# Patient Record
Sex: FEMALE | Race: OTHER | HISPANIC OR LATINO
[De-identification: names, ages, dates, MRNs, and addresses within clinical notes are randomized per-mention and may not be internally consistent; named-entity substitution may affect disease eponyms.]

---

## 2022-06-23 ENCOUNTER — APPOINTMENT (OUTPATIENT)
Dept: DERMATOLOGY | Facility: CLINIC | Age: 52
End: 2022-06-23
Payer: MEDICAID

## 2022-06-23 VITALS — WEIGHT: 150 LBS | BODY MASS INDEX: 28.32 KG/M2 | HEIGHT: 61 IN

## 2022-06-23 DIAGNOSIS — L30.9 DERMATITIS, UNSPECIFIED: ICD-10-CM

## 2022-06-23 PROBLEM — Z00.00 ENCOUNTER FOR PREVENTIVE HEALTH EXAMINATION: Status: ACTIVE | Noted: 2022-06-23

## 2022-06-23 PROCEDURE — 99204 OFFICE O/P NEW MOD 45 MIN: CPT

## 2022-06-23 RX ORDER — HYDROCORTISONE 25 MG/G
2.5 CREAM TOPICAL
Qty: 1 | Refills: 1 | Status: ACTIVE | COMMUNITY
Start: 2022-06-23 | End: 1900-01-01

## 2022-06-23 NOTE — HISTORY OF PRESENT ILLNESS
[FreeTextEntry1] : rash on the face [de-identified] : 51F here for rash around the mouth and NLF x months. Itchy. Using vaseline with some improvement.

## 2022-06-23 NOTE — PHYSICAL EXAM
[Alert] : alert [Oriented x 3] : ~L oriented x 3 [Well Nourished] : well nourished [Conjunctiva Non-injected] : conjunctiva non-injected [No Visual Lymphadenopathy] : no visual  lymphadenopathy [No Clubbing] : no clubbing [No Edema] : no edema [No Bromhidrosis] : no bromhidrosis [No Chromhidrosis] : no chromhidrosis [FreeTextEntry3] : Mild erythema lovely-orally today

## 2022-09-08 ENCOUNTER — APPOINTMENT (OUTPATIENT)
Dept: DERMATOLOGY | Facility: CLINIC | Age: 52
End: 2022-09-08

## 2022-11-02 ENCOUNTER — OFFICE (OUTPATIENT)
Dept: URBAN - METROPOLITAN AREA CLINIC 94 | Facility: CLINIC | Age: 52
Setting detail: OPHTHALMOLOGY
End: 2022-11-02
Payer: COMMERCIAL

## 2022-11-02 DIAGNOSIS — H40.013: ICD-10-CM

## 2022-11-02 PROCEDURE — 92083 EXTENDED VISUAL FIELD XM: CPT | Performed by: OPHTHALMOLOGY

## 2022-11-02 PROCEDURE — 92250 FUNDUS PHOTOGRAPHY W/I&R: CPT | Performed by: OPHTHALMOLOGY

## 2022-11-02 PROCEDURE — 92012 INTRM OPH EXAM EST PATIENT: CPT | Performed by: OPHTHALMOLOGY

## 2022-11-02 ASSESSMENT — REFRACTION_AUTOREFRACTION
OS_CYLINDER: -0.75
OD_AXIS: 083
OD_CYLINDER: -0.50
OD_SPHERE: +2.25
OS_SPHERE: +2.50
OS_AXIS: 112

## 2022-11-02 ASSESSMENT — REFRACTION_MANIFEST
OD_VA1: 20/20
OS_CYLINDER: SPH
OS_VA1: 20/20
OS_SPHERE: +1.00
OS_SPHERE: +1.50
OD_ADD: +2.00
OS_AXIS: 112
OS_ADD: +2.00
OS_CYLINDER: -0.25
OD_SPHERE: +1.00
OS_ADD: +1.75
OD_CYLINDER: SPH
OD_VA1: 20/20
OD_VA2: 20/20
OD_AXIS: 066
OD_CYLINDER: -0.25
OS_VA1: 20/20
OU_VA: 20/20
OD_ADD: +1.75
OS_VA2: 20/20
OU_VA: 20/20
OD_SPHERE: +1.50

## 2022-11-02 ASSESSMENT — SUPERFICIAL PUNCTATE KERATITIS (SPK)
OS_SPK: T
OD_SPK: 1+

## 2022-11-02 ASSESSMENT — CONFRONTATIONAL VISUAL FIELD TEST (CVF)
OD_FINDINGS: FULL
OS_FINDINGS: FULL

## 2022-11-02 ASSESSMENT — TONOMETRY
OD_IOP_MMHG: 15
OS_IOP_MMHG: 15

## 2022-11-02 ASSESSMENT — SPHEQUIV_DERIVED
OS_SPHEQUIV: 0.875
OD_SPHEQUIV: 2
OD_SPHEQUIV: 0.875
OS_SPHEQUIV: 2.125

## 2022-11-02 ASSESSMENT — KERATOMETRY
OS_K2POWER_DIOPTERS: 43.50
OS_K1POWER_DIOPTERS: 43.00
OD_K2POWER_DIOPTERS: 43.00
OD_K1POWER_DIOPTERS: 42.50
OD_AXISANGLE_DEGREES: 113
OS_AXISANGLE_DEGREES: 064

## 2022-11-02 ASSESSMENT — REFRACTION_CURRENTRX
OD_ADD: +2.00
OD_VPRISM_DIRECTION: PROGS
OS_AXIS: 000
OS_VPRISM_DIRECTION: PROGS
OS_SPHERE: +1.50
OD_OVR_VA: 20/
OD_AXIS: 000
OD_SPHERE: +1.50
OS_OVR_VA: 20/
OS_CYLINDER: 0.00
OD_CYLINDER: 0.00
OS_ADD: +2.00

## 2022-11-02 ASSESSMENT — AXIALLENGTH_DERIVED
OS_AL: 23.3454
OS_AL: 22.8781
OD_AL: 23.5267
OD_AL: 23.0987

## 2022-11-02 ASSESSMENT — VISUAL ACUITY
OS_BCVA: 20/30-1
OD_BCVA: 20/25

## 2022-11-02 ASSESSMENT — TEAR BREAK UP TIME (TBUT)
OS_TBUT: 1+
OD_TBUT: 1+

## 2023-05-03 ENCOUNTER — OFFICE (OUTPATIENT)
Dept: URBAN - METROPOLITAN AREA CLINIC 94 | Facility: CLINIC | Age: 53
Setting detail: OPHTHALMOLOGY
End: 2023-05-03
Payer: COMMERCIAL

## 2023-05-03 DIAGNOSIS — H40.013: ICD-10-CM

## 2023-05-03 DIAGNOSIS — H25.13: ICD-10-CM

## 2023-05-03 DIAGNOSIS — H04.123: ICD-10-CM

## 2023-05-03 PROCEDURE — 92133 CPTRZD OPH DX IMG PST SGM ON: CPT | Performed by: OPHTHALMOLOGY

## 2023-05-03 PROCEDURE — 92020 GONIOSCOPY: CPT | Performed by: OPHTHALMOLOGY

## 2023-05-03 PROCEDURE — 92014 COMPRE OPH EXAM EST PT 1/>: CPT | Performed by: OPHTHALMOLOGY

## 2023-05-03 ASSESSMENT — TEAR BREAK UP TIME (TBUT)
OD_TBUT: 1+
OS_TBUT: 1+

## 2023-05-03 ASSESSMENT — SPHEQUIV_DERIVED
OD_SPHEQUIV: 2.125
OS_SPHEQUIV: 0.875
OS_SPHEQUIV: 2.125
OD_SPHEQUIV: 0.875

## 2023-05-03 ASSESSMENT — REFRACTION_AUTOREFRACTION
OS_CYLINDER: -0.75
OD_CYLINDER: -0.75
OD_AXIS: 082
OS_AXIS: 105
OD_SPHERE: +2.50
OS_SPHERE: +2.50

## 2023-05-03 ASSESSMENT — REFRACTION_MANIFEST
OS_SPHERE: +1.50
OS_SPHERE: +1.00
OD_SPHERE: +1.00
OS_CYLINDER: SPH
OD_CYLINDER: -0.25
OU_VA: 20/20
OD_VA2: 20/20
OU_VA: 20/20
OS_ADD: +1.75
OS_CYLINDER: -0.25
OD_ADD: +2.00
OD_VA1: 20/20
OS_VA1: 20/20
OS_VA2: 20/20
OD_AXIS: 066
OS_AXIS: 112
OS_VA1: 20/20
OD_ADD: +1.75
OD_VA1: 20/20
OS_ADD: +2.00
OD_CYLINDER: SPH
OD_SPHERE: +1.50

## 2023-05-03 ASSESSMENT — AXIALLENGTH_DERIVED
OD_AL: 23.4357
OD_AL: 22.9648
OS_AL: 23.3905
OS_AL: 22.9214

## 2023-05-03 ASSESSMENT — CONFRONTATIONAL VISUAL FIELD TEST (CVF)
OD_FINDINGS: FULL
OS_FINDINGS: FULL

## 2023-05-03 ASSESSMENT — REFRACTION_CURRENTRX
OD_OVR_VA: 20/
OS_OVR_VA: 20/
OS_CYLINDER: 0.00
OD_AXIS: 000
OS_ADD: +2.00
OD_ADD: +2.00
OD_SPHERE: +1.50
OS_AXIS: 000
OS_VPRISM_DIRECTION: PROGS
OD_CYLINDER: 0.00
OS_SPHERE: +1.50
OD_VPRISM_DIRECTION: PROGS

## 2023-05-03 ASSESSMENT — TONOMETRY
OS_IOP_MMHG: 16
OD_IOP_MMHG: 16

## 2023-05-03 ASSESSMENT — KERATOMETRY
OD_K1POWER_DIOPTERS: 43.00
OD_K2POWER_DIOPTERS: 43.00
OS_K1POWER_DIOPTERS: 43.00
OD_AXISANGLE_DEGREES: 090
OS_AXISANGLE_DEGREES: 064
OS_K2POWER_DIOPTERS: 43.25

## 2023-05-03 ASSESSMENT — VISUAL ACUITY
OD_BCVA: 20/25-1
OS_BCVA: 20/40

## 2023-05-03 ASSESSMENT — SUPERFICIAL PUNCTATE KERATITIS (SPK)
OD_SPK: 1+
OS_SPK: T

## 2023-05-17 ENCOUNTER — OFFICE (OUTPATIENT)
Dept: URBAN - METROPOLITAN AREA CLINIC 116 | Facility: CLINIC | Age: 53
Setting detail: OPHTHALMOLOGY
End: 2023-05-17
Payer: COMMERCIAL

## 2023-05-17 DIAGNOSIS — H10.45: ICD-10-CM

## 2023-05-17 PROCEDURE — 92014 COMPRE OPH EXAM EST PT 1/>: CPT | Performed by: OPTOMETRIST

## 2023-05-17 ASSESSMENT — REFRACTION_CURRENTRX
OS_SPHERE: +1.50
OD_OVR_VA: 20/
OS_AXIS: 000
OD_SPHERE: +1.75
OS_OVR_VA: 20/
OD_AXIS: 180
OD_OVR_VA: 20/
OD_ADD: +2.00
OD_AXIS: 000
OD_VPRISM_DIRECTION: PROGS
OS_VPRISM_DIRECTION: PROGS
OD_CYLINDER: -0.25
OS_CYLINDER: SPH
OS_ADD: +1.75
OD_CYLINDER: 0.00
OS_OVR_VA: 20/
OS_SPHERE: +1.50
OS_VPRISM_DIRECTION: PROGS
OD_SPHERE: +1.50
OD_ADD: +1.75
OS_CYLINDER: 0.00
OS_ADD: +2.00
OD_VPRISM_DIRECTION: PROGS

## 2023-05-17 ASSESSMENT — REFRACTION_MANIFEST
OS_VA1: 20/20
OD_ADD: +2.00
OD_SPHERE: +1.50
OS_VA2: 20/20
OD_CYLINDER: SPH
OS_ADD: +2.00
OU_VA: 20/20
OD_VA1: 20/20
OD_ADD: +2.00
OD_VA1: 20/20
OS_CYLINDER: SPH
OD_ADD: +1.75
OS_SPHERE: +1.50
OD_AXIS: 066
OS_CYLINDER: -0.25
OS_ADD: +1.75
OS_VA1: 20/20
OD_SPHERE: +1.00
OS_VA1: 20/20
OS_CYLINDER: SPH
OS_SPHERE: +1.50
OS_AXIS: 112
OS_ADD: +2.00
OD_SPHERE: +1.50
OD_CYLINDER: -0.25
OS_SPHERE: +1.00
OD_VA1: 20/20
OU_VA: 20/20
OD_VA2: 20/20
OD_CYLINDER: SPH

## 2023-05-17 ASSESSMENT — AXIALLENGTH_DERIVED
OS_AL: 23.0601
OD_AL: 23.1041
OD_AL: 23.4357
OS_AL: 23.3905

## 2023-05-17 ASSESSMENT — CONFRONTATIONAL VISUAL FIELD TEST (CVF)
OS_FINDINGS: FULL
OD_FINDINGS: FULL

## 2023-05-17 ASSESSMENT — REFRACTION_AUTOREFRACTION
OS_AXIS: 115
OS_CYLINDER: -0.50
OD_CYLINDER: -0.50
OD_AXIS: 080
OS_SPHERE: +2.00
OD_SPHERE: +2.00

## 2023-05-17 ASSESSMENT — SUPERFICIAL PUNCTATE KERATITIS (SPK)
OD_SPK: 1+
OS_SPK: T

## 2023-05-17 ASSESSMENT — SPHEQUIV_DERIVED
OD_SPHEQUIV: 0.875
OD_SPHEQUIV: 1.75
OS_SPHEQUIV: 1.75
OS_SPHEQUIV: 0.875

## 2023-05-17 ASSESSMENT — KERATOMETRY
OS_K1POWER_DIOPTERS: 43.00
OD_AXISANGLE_DEGREES: 090
OS_K2POWER_DIOPTERS: 43.25
OD_K1POWER_DIOPTERS: 43.00
OS_AXISANGLE_DEGREES: 105
OD_K2POWER_DIOPTERS: 43.00

## 2023-05-17 ASSESSMENT — TEAR BREAK UP TIME (TBUT)
OS_TBUT: 1+
OD_TBUT: 1+

## 2023-05-17 ASSESSMENT — VISUAL ACUITY
OS_BCVA: 20/40
OD_BCVA: 20/25-1

## 2023-05-17 ASSESSMENT — TONOMETRY
OS_IOP_MMHG: 19
OD_IOP_MMHG: 18

## 2023-11-01 ENCOUNTER — OFFICE (OUTPATIENT)
Dept: URBAN - METROPOLITAN AREA CLINIC 94 | Facility: CLINIC | Age: 53
Setting detail: OPHTHALMOLOGY
End: 2023-11-01
Payer: COMMERCIAL

## 2023-11-01 DIAGNOSIS — H25.13: ICD-10-CM

## 2023-11-01 DIAGNOSIS — H40.013: ICD-10-CM

## 2023-11-01 DIAGNOSIS — H52.4: ICD-10-CM

## 2023-11-01 PROCEDURE — 92012 INTRM OPH EXAM EST PATIENT: CPT | Performed by: OPHTHALMOLOGY

## 2023-11-01 PROCEDURE — 92083 EXTENDED VISUAL FIELD XM: CPT | Performed by: OPHTHALMOLOGY

## 2023-11-01 PROCEDURE — 92015 DETERMINE REFRACTIVE STATE: CPT | Performed by: OPHTHALMOLOGY

## 2023-11-01 PROCEDURE — 92250 FUNDUS PHOTOGRAPHY W/I&R: CPT | Performed by: OPHTHALMOLOGY

## 2023-11-01 ASSESSMENT — REFRACTION_CURRENTRX
OD_ADD: +2.00
OD_AXIS: 180
OD_VPRISM_DIRECTION: PROGS
OD_SPHERE: +1.75
OD_VPRISM_DIRECTION: PROGS
OS_ADD: +1.75
OS_OVR_VA: 20/
OD_ADD: +1.75
OS_SPHERE: +1.50
OS_OVR_VA: 20/
OS_AXIS: 000
OS_SPHERE: +1.50
OD_SPHERE: +1.50
OD_OVR_VA: 20/
OS_VPRISM_DIRECTION: PROGS
OS_ADD: +2.00
OD_AXIS: 000
OS_CYLINDER: SPH
OD_OVR_VA: 20/
OS_VPRISM_DIRECTION: PROGS
OS_CYLINDER: 0.00
OD_CYLINDER: -0.25
OD_CYLINDER: 0.00

## 2023-11-01 ASSESSMENT — REFRACTION_MANIFEST
OD_ADD: +2.00
OU_VA: 20/20
OS_ADD: +2.00
OD_CYLINDER: SPH
OD_VA2: 20/20
OS_VA1: 20/20
OD_ADD: +1.75
OD_SPHERE: +1.00
OD_SPHERE: +1.50
OD_VA1: 20/20
OS_VA2: 20/20
OS_CYLINDER: SPH
OS_AXIS: 112
OS_CYLINDER: SPH
OS_VA1: 20/20
OS_VA1: 20/20
OS_CYLINDER: -0.25
OD_VA1: 20/20
OD_CYLINDER: SPH
OS_ADD: +2.00
OS_SPHERE: +1.50
OU_VA: 20/20
OD_ADD: +2.00
OS_SPHERE: +1.50
OD_VA1: 20/20
OD_CYLINDER: -0.25
OS_ADD: +1.75
OD_SPHERE: +1.50
OS_SPHERE: +1.00
OD_AXIS: 066

## 2023-11-01 ASSESSMENT — CONFRONTATIONAL VISUAL FIELD TEST (CVF)
OS_FINDINGS: FULL
OD_FINDINGS: FULL

## 2023-11-01 ASSESSMENT — SPHEQUIV_DERIVED
OS_SPHEQUIV: 0.875
OS_SPHEQUIV: 1.75
OD_SPHEQUIV: 0.875
OD_SPHEQUIV: 1.875

## 2023-11-01 ASSESSMENT — REFRACTION_AUTOREFRACTION
OD_AXIS: 072
OS_SPHERE: +2.00
OS_AXIS: 100
OD_SPHERE: +2.25
OS_CYLINDER: -0.50
OD_CYLINDER: -0.75

## 2023-11-01 ASSESSMENT — SUPERFICIAL PUNCTATE KERATITIS (SPK)
OS_SPK: T
OD_SPK: 1+

## 2023-11-01 ASSESSMENT — TEAR BREAK UP TIME (TBUT)
OD_TBUT: 1+
OS_TBUT: 1+

## 2024-05-15 ENCOUNTER — OFFICE (OUTPATIENT)
Dept: URBAN - METROPOLITAN AREA CLINIC 94 | Facility: CLINIC | Age: 54
Setting detail: OPHTHALMOLOGY
End: 2024-05-15
Payer: COMMERCIAL

## 2024-05-15 DIAGNOSIS — H40.013: ICD-10-CM

## 2024-05-15 PROCEDURE — 92133 CPTRZD OPH DX IMG PST SGM ON: CPT | Performed by: OPHTHALMOLOGY

## 2024-05-15 PROCEDURE — 92014 COMPRE OPH EXAM EST PT 1/>: CPT | Performed by: OPHTHALMOLOGY

## 2024-05-15 ASSESSMENT — CONFRONTATIONAL VISUAL FIELD TEST (CVF)
OD_FINDINGS: FULL
OS_FINDINGS: FULL

## 2024-11-20 ENCOUNTER — OFFICE (OUTPATIENT)
Dept: URBAN - METROPOLITAN AREA CLINIC 94 | Facility: CLINIC | Age: 54
Setting detail: OPHTHALMOLOGY
End: 2024-11-20
Payer: COMMERCIAL

## 2024-11-20 DIAGNOSIS — H25.13: ICD-10-CM

## 2024-11-20 DIAGNOSIS — H10.45: ICD-10-CM

## 2024-11-20 DIAGNOSIS — H04.123: ICD-10-CM

## 2024-11-20 DIAGNOSIS — H40.013: ICD-10-CM

## 2024-11-20 PROCEDURE — 92020 GONIOSCOPY: CPT | Performed by: OPHTHALMOLOGY

## 2024-11-20 PROCEDURE — 92250 FUNDUS PHOTOGRAPHY W/I&R: CPT | Performed by: OPHTHALMOLOGY

## 2024-11-20 PROCEDURE — 92083 EXTENDED VISUAL FIELD XM: CPT | Performed by: OPHTHALMOLOGY

## 2024-11-20 PROCEDURE — 92014 COMPRE OPH EXAM EST PT 1/>: CPT | Performed by: OPHTHALMOLOGY

## 2024-11-20 ASSESSMENT — REFRACTION_AUTOREFRACTION
OD_AXIS: 081
OS_CYLINDER: -0.75
OD_SPHERE: +2.50
OS_AXIS: 108
OD_CYLINDER: -0.75
OS_SPHERE: +2.50

## 2024-11-20 ASSESSMENT — REFRACTION_CURRENTRX
OD_AXIS: 180
OD_AXIS: 000
OD_ADD: +2.25
OD_SPHERE: +1.75
OD_OVR_VA: 20/
OD_SPHERE: +1.50
OS_SPHERE: +1.50
OS_AXIS: 000
OD_VPRISM_DIRECTION: PROGS
OS_SPHERE: +1.50
OS_SPHERE: +1.50
OD_VPRISM_DIRECTION: PROGS
OS_CYLINDER: SPH
OS_AXIS: 000
OS_ADD: +2.00
OD_OVR_VA: 20/
OS_VPRISM_DIRECTION: PROGS
OD_ADD: +1.75
OD_CYLINDER: 0.00
OS_OVR_VA: 20/
OD_OVR_VA: 20/
OS_CYLINDER: 0.00
OD_AXIS: 000
OS_SPHERE: +1.50
OD_ADD: +2.00
OS_OVR_VA: 20/
OS_ADD: +2.25
OS_OVR_VA: 20/
OD_CYLINDER: -0.25
OS_ADD: +2.25
OS_ADD: +1.75
OS_CYLINDER: 0.00
OS_VPRISM_DIRECTION: PROGS
OS_VPRISM_DIRECTION: PROGS
OD_SPHERE: +1.50
OS_VPRISM_DIRECTION: PROGS
OD_SPHERE: +1.50
OD_CYLINDER: 0.00
OD_ADD: +2.25

## 2024-11-20 ASSESSMENT — REFRACTION_MANIFEST
OD_SPHERE: +1.00
OS_AXIS: 112
OD_VA1: 20/20
OD_VA1: 20/20
OD_CYLINDER: SPH
OD_CYLINDER: -0.25
OS_VA1: 20/20
OD_ADD: +2.00
OS_SPHERE: +1.00
OD_VA1: 20/20
OS_ADD: +2.00
OS_CYLINDER: SPH
OD_VA2: 20/20
OS_SPHERE: +1.50
OD_SPHERE: +1.50
OD_SPHERE: +1.50
OS_CYLINDER: SPH
OU_VA: 20/20
OS_VA1: 20/20
OD_ADD: +1.75
OD_AXIS: 066
OS_SPHERE: +1.50
OS_VA2: 20/20
OU_VA: 20/20
OS_ADD: +1.75
OS_ADD: +2.00
OS_CYLINDER: -0.25
OS_VA1: 20/20
OD_ADD: +2.00
OD_CYLINDER: SPH

## 2024-11-20 ASSESSMENT — TEAR BREAK UP TIME (TBUT)
OS_TBUT: 1+
OD_TBUT: 1+

## 2024-11-20 ASSESSMENT — SUPERFICIAL PUNCTATE KERATITIS (SPK)
OD_SPK: 1+
OS_SPK: T

## 2024-11-20 ASSESSMENT — VISUAL ACUITY
OS_BCVA: 20/30
OD_BCVA: 20/25

## 2024-11-20 ASSESSMENT — KERATOMETRY
OD_AXISANGLE_DEGREES: 098
OS_K1POWER_DIOPTERS: 43.00
OS_K2POWER_DIOPTERS: 43.25
OD_K2POWER_DIOPTERS: 43.25
OS_AXISANGLE_DEGREES: 073
OD_K1POWER_DIOPTERS: 43.00

## 2024-11-20 ASSESSMENT — CONFRONTATIONAL VISUAL FIELD TEST (CVF)
OS_FINDINGS: FULL
OD_FINDINGS: FULL

## 2024-11-20 ASSESSMENT — TONOMETRY
OD_IOP_MMHG: 17
OS_IOP_MMHG: 17

## 2025-05-28 ENCOUNTER — OFFICE (OUTPATIENT)
Dept: URBAN - METROPOLITAN AREA CLINIC 94 | Facility: CLINIC | Age: 55
Setting detail: OPHTHALMOLOGY
End: 2025-05-28
Payer: COMMERCIAL

## 2025-05-28 DIAGNOSIS — H25.13: ICD-10-CM

## 2025-05-28 DIAGNOSIS — H04.123: ICD-10-CM

## 2025-05-28 DIAGNOSIS — H40.013: ICD-10-CM

## 2025-05-28 PROCEDURE — 92133 CPTRZD OPH DX IMG PST SGM ON: CPT | Performed by: OPHTHALMOLOGY

## 2025-05-28 PROCEDURE — 92014 COMPRE OPH EXAM EST PT 1/>: CPT | Performed by: OPHTHALMOLOGY

## 2025-05-28 ASSESSMENT — REFRACTION_CURRENTRX
OD_OVR_VA: 20/
OS_VPRISM_DIRECTION: PROGS
OD_OVR_VA: 20/
OD_CYLINDER: 0.00
OS_SPHERE: +1.50
OS_CYLINDER: 0.00
OS_CYLINDER: 0.00
OS_VPRISM_DIRECTION: PROGS
OS_SPHERE: +1.50
OS_CYLINDER: SPH
OD_OVR_VA: 20/
OS_OVR_VA: 20/
OS_ADD: +2.25
OS_VPRISM_DIRECTION: PROGS
OD_SPHERE: +1.50
OS_VPRISM_DIRECTION: PROGS
OD_VPRISM_DIRECTION: PROGS
OD_SPHERE: +1.50
OS_OVR_VA: 20/
OD_VPRISM_DIRECTION: PROGS
OD_ADD: +2.25
OS_SPHERE: +1.50
OS_ADD: +2.00
OD_CYLINDER: 0.00
OD_ADD: +2.00
OD_VPRISM_DIRECTION: PROGS
OS_ADD: +2.25
OS_ADD: +1.75
OS_AXIS: 000
OS_SPHERE: +1.50
OD_SPHERE: +1.50
OD_AXIS: 000
OS_AXIS: 000
OD_VPRISM_DIRECTION: PROGS
OD_AXIS: 180
OD_CYLINDER: -0.25
OD_ADD: +1.75
OS_OVR_VA: 20/
OD_SPHERE: +1.75
OD_ADD: +2.25
OD_AXIS: 000

## 2025-05-28 ASSESSMENT — KERATOMETRY
OD_K2POWER_DIOPTERS: 43.25
OS_K2POWER_DIOPTERS: 43.50
OD_K1POWER_DIOPTERS: 43.00
OS_K1POWER_DIOPTERS: 43.25
OS_AXISANGLE_DEGREES: 036
OD_AXISANGLE_DEGREES: 109

## 2025-05-28 ASSESSMENT — REFRACTION_MANIFEST
OS_SPHERE: +1.50
OS_AXIS: 112
OU_VA: 20/20
OS_CYLINDER: -0.25
OS_ADD: +2.00
OD_SPHERE: +1.50
OD_ADD: +2.00
OD_ADD: +2.00
OD_CYLINDER: -0.25
OS_CYLINDER: SPH
OS_VA1: 20/20
OD_ADD: +1.75
OS_VA1: 20/20
OS_ADD: +2.00
OD_SPHERE: +1.50
OD_VA1: 20/20
OS_SPHERE: +1.50
OS_CYLINDER: SPH
OD_AXIS: 066
OS_VA2: 20/20
OD_CYLINDER: SPH
OD_VA2: 20/20
OS_ADD: +1.75
OD_VA1: 20/20
OD_CYLINDER: SPH
OS_VA1: 20/20
OU_VA: 20/20
OD_VA1: 20/20
OS_SPHERE: +1.00
OD_SPHERE: +1.00

## 2025-05-28 ASSESSMENT — CONFRONTATIONAL VISUAL FIELD TEST (CVF)
OD_FINDINGS: FULL
OS_FINDINGS: FULL

## 2025-05-28 ASSESSMENT — TONOMETRY
OS_IOP_MMHG: 18
OD_IOP_MMHG: 18

## 2025-05-28 ASSESSMENT — SUPERFICIAL PUNCTATE KERATITIS (SPK)
OD_SPK: 1+
OS_SPK: T

## 2025-05-28 ASSESSMENT — REFRACTION_AUTOREFRACTION
OS_CYLINDER: -0.75
OS_SPHERE: +2.50
OS_AXIS: 110
OD_AXIS: 090
OD_CYLINDER: -0.75
OD_SPHERE: +2.50

## 2025-05-28 ASSESSMENT — TEAR BREAK UP TIME (TBUT)
OD_TBUT: 1+
OS_TBUT: 1+

## 2025-05-28 ASSESSMENT — VISUAL ACUITY
OS_BCVA: 20/150
OD_BCVA: 20/70